# Patient Record
Sex: MALE | Race: WHITE | NOT HISPANIC OR LATINO | Employment: UNEMPLOYED | URBAN - NONMETROPOLITAN AREA
[De-identification: names, ages, dates, MRNs, and addresses within clinical notes are randomized per-mention and may not be internally consistent; named-entity substitution may affect disease eponyms.]

---

## 2021-05-19 ENCOUNTER — HOSPITAL ENCOUNTER (EMERGENCY)
Facility: HOSPITAL | Age: 31
Discharge: HOME | End: 2021-05-19
Attending: EMERGENCY MEDICINE

## 2021-05-19 VITALS
TEMPERATURE: 97.8 F | SYSTOLIC BLOOD PRESSURE: 143 MMHG | BODY MASS INDEX: 24.25 KG/M2 | RESPIRATION RATE: 19 BRPM | WEIGHT: 160 LBS | HEIGHT: 68 IN | OXYGEN SATURATION: 100 % | DIASTOLIC BLOOD PRESSURE: 86 MMHG | HEART RATE: 86 BPM

## 2021-05-19 DIAGNOSIS — Z48.02 VISIT FOR SUTURE REMOVAL: ICD-10-CM

## 2021-05-19 DIAGNOSIS — Z51.89 ENCOUNTER FOR WOUND RE-CHECK: Primary | ICD-10-CM

## 2021-05-19 PROCEDURE — 99281 EMR DPT VST MAYX REQ PHY/QHP: CPT | Performed by: EMERGENCY MEDICINE

## 2021-05-19 PROCEDURE — 99283 EMERGENCY DEPT VISIT LOW MDM: CPT

## 2021-05-19 RX ORDER — CEPHALEXIN 500 MG/1
500 CAPSULE ORAL EVERY 12 HOURS SCHEDULED
Qty: 10 CAPSULE | Refills: 0 | Status: SHIPPED | OUTPATIENT
Start: 2021-05-19 | End: 2021-05-24

## 2021-05-19 RX ORDER — BACITRACIN, NEOMYCIN, POLYMYXIN B 400; 3.5; 5 [USP'U]/G; MG/G; [USP'U]/G
1 OINTMENT TOPICAL ONCE
Status: COMPLETED | OUTPATIENT
Start: 2021-05-19 | End: 2021-05-19

## 2021-05-19 RX ADMIN — BACITRACIN, NEOMYCIN, POLYMYXIN B 1 SMALL APPLICATION: 400; 3.5; 5 OINTMENT TOPICAL at 11:38
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
## 2022-04-30 ENCOUNTER — HOSPITAL ENCOUNTER (EMERGENCY)
Facility: HOSPITAL | Age: 32
Discharge: HOME | End: 2022-04-30
Attending: EMERGENCY MEDICINE | Admitting: EMERGENCY MEDICINE
Payer: COMMERCIAL

## 2022-04-30 VITALS
TEMPERATURE: 97.7 F | OXYGEN SATURATION: 100 % | SYSTOLIC BLOOD PRESSURE: 141 MMHG | DIASTOLIC BLOOD PRESSURE: 74 MMHG | WEIGHT: 140 LBS | HEART RATE: 75 BPM | RESPIRATION RATE: 18 BRPM | BODY MASS INDEX: 23.32 KG/M2 | HEIGHT: 65 IN

## 2022-04-30 DIAGNOSIS — T15.00XA CORNEAL FOREIGN BODY: Primary | ICD-10-CM

## 2022-04-30 PROCEDURE — 90715 TDAP VACCINE 7 YRS/> IM: CPT | Performed by: EMERGENCY MEDICINE

## 2022-04-30 PROCEDURE — 99283 EMERGENCY DEPT VISIT LOW MDM: CPT | Performed by: EMERGENCY MEDICINE

## 2022-04-30 PROCEDURE — 90471 IMMUNIZATION ADMIN: CPT

## 2022-04-30 PROCEDURE — 99283 EMERGENCY DEPT VISIT LOW MDM: CPT

## 2022-04-30 RX ORDER — TETRACAINE HYDROCHLORIDE 5 MG/ML
1 SOLUTION OPHTHALMIC ONCE
Status: COMPLETED | OUTPATIENT
Start: 2022-04-30 | End: 2022-04-30

## 2022-04-30 RX ORDER — ERYTHROMYCIN 5 MG/G
0.5 OINTMENT OPHTHALMIC ONCE
Status: COMPLETED | OUTPATIENT
Start: 2022-04-30 | End: 2022-04-30

## 2022-04-30 RX ORDER — ERYTHROMYCIN 5 MG/G
OINTMENT OPHTHALMIC
Qty: 3.5 G | Refills: 0 | Status: SHIPPED | OUTPATIENT
Start: 2022-04-30

## 2022-04-30 RX ADMIN — TETRACAINE HYDROCHLORIDE 1 DROP: 5 SOLUTION OPHTHALMIC at 13:04

## 2022-04-30 RX ADMIN — ERYTHROMYCIN 0.5 INCH: 5 OINTMENT OPHTHALMIC at 13:02

## 2022-04-30 RX ADMIN — FLUORESCEIN SODIUM 1 STRIP: 1 STRIP OPHTHALMIC at 13:04

## 2022-04-30 RX ADMIN — TETANUS TOXOID, REDUCED DIPHTHERIA TOXOID AND ACELLULAR PERTUSSIS VACCINE, ADSORBED 0.5 ML: 5; 2.5; 8; 8; 2.5 SUSPENSION INTRAMUSCULAR at 12:59

## 2022-04-30 NOTE — DISCHARGE INSTRUCTIONS
Return immediately if worse or any new symptoms  Please call ophthalmologist as soon as possible to arrange appointment as soon as possible:    [facility] Ophthalmology scheduling call [address] [facility] [place] call [phone]    [facility] [address], [facility] [place] call [facility] [address] [place] [address] [place] call [phone]    [facility] [address] [place] [address], [address], [address] call [address] [facility] [address] [facility], [place], [place] call [facility] [place], [place] [address] [place] [address], [address] [place] [address] call [phone]

## 2022-04-30 NOTE — ED PROVIDER NOTES
History  Chief Complaint   Patient presents with    Eye Problem     pt arrives reporting he was using a  yesterday when a piece of metal flew up and hit left eye area  53-year-old male grinding muffler yesterday with devlin in face incurred left eye foreign body, believes he rinsed out, unsure of recent tetanus      Eye Problem  Location:  Left eye  Quality:  Dull  Severity:  Mild  Onset quality:  Sudden  Timing:  Constant  Progression:  Unchanged  Chronicity:  New  Context: direct trauma    Relieved by:  Nothing  Worsened by:  Nothing  Ineffective treatments:  None tried  Associated symptoms: blurred vision    Risk factors: no conjunctival hemorrhage and no previous injury to eye        None       History reviewed  No pertinent past medical history  History reviewed  No pertinent surgical history  History reviewed  No pertinent family history  I have reviewed and agree with the history as documented  E-Cigarette/Vaping     E-Cigarette/Vaping Substances     Social History     Tobacco Use    Smoking status: Current Every Day Smoker     Packs/day: 1 00    Smokeless tobacco: Never Used   Substance Use Topics    Alcohol use: Not Currently    Drug use: Not Currently       Review of Systems   Eyes: Positive for blurred vision  All other systems reviewed and are negative  Physical Exam  Physical Exam  Vitals and nursing note reviewed  Constitutional:       General: He is not in acute distress  Eyes:      Extraocular Movements: Extraocular movements intact  Pupils: Pupils are equal, round, and reactive to light        Comments: Left eye vision 20/30    Left eye:  Conjunctival injection, tiny inflammatory area at superomedial quadrant of cornea with central foreign body, eccentric located away from pupil   Skin:     Comments: Superficial burn abrasions at left medial canthus irregular less than 1 cm diameter as well as right cancel/mouth area and right mental facial area Neurological:      Mental Status: He is alert           Vital Signs  ED Triage Vitals [04/30/22 1219]   Temperature Pulse Respirations Blood Pressure SpO2   97 7 °F (36 5 °C) 75 18 141/74 100 %      Temp src Heart Rate Source Patient Position - Orthostatic VS BP Location FiO2 (%)   -- -- -- -- --      Pain Score       --           Vitals:    04/30/22 1219   BP: 141/74   Pulse: 75         Visual Acuity      ED Medications  Medications   tetracaine 0 5 % ophthalmic solution 1 drop (has no administration in time range)   erythromycin (ILOTYCIN) 0 5 % ophthalmic ointment 0 5 inch (has no administration in time range)   fluorescein sodium sterile ophthalmic strip 1 strip (has no administration in time range)   tetanus-diphtheria-acellular pertussis (BOOSTRIX) IM injection 0 5 mL (0 5 mL Intramuscular Given 4/30/22 1259)       Diagnostic Studies  Results Reviewed     None                 No orders to display              Procedures  Procedures         ED Course  ED Course as of 04/30/22 1301   Sat Apr 30, 2022   1300 Procedure note:Tetracaine instilled 2 drops, fluorescein placed, no fluorescein uptake    Unable to free foreign body with a Q-tip or blunt needle, but inflammatory material removed, using tiny bur foreign body removed, no staining, well tolerated, voice good understanding of close outpatient follow-up and will return immediately if worse or any new symptoms or if incompletely resolved within 24 hours and follow up with Ophthalmology                                             MDM    Disposition  Final diagnoses:   Corneal foreign body - Removed, inflamed     Time reflects when diagnosis was documented in both MDM as applicable and the Disposition within this note     Time User Action Codes Description Comment    4/30/2022 12:41 PM Gil Aburto Add [T15 00XA] Corneal foreign body     4/30/2022 12:41 PM Gil Aburto Modify [T15 00XA] Corneal foreign body Removed, inflamed      ED Disposition     ED Disposition Condition Date/Time Comment    Discharge Stable Sat Apr 30, 2022 12:40 PM Lee Ford discharge to home/self care  Follow-up Information     Follow up With Specialties Details Why [address] [place], [place] Optometry   [facility]  [address],[facility] Floor  [phone]            Patient's Medications   Discharge Prescriptions    ERYTHROMYCIN (ILOTYCIN) OPHTHALMIC OINTMENT    Place a 1/2 inch ribbon of ointment into the lower eyelid every 6 hours and small amount as needed for comfort       Start Date: 4/30/2022 End Date: --       Order Dose: --       Quantity: 3 5 g    Refills: 0       No discharge procedures on file      PDMP Review     None          ED Provider  Electronically Signed by           Juanpablo Fernandez DO  04/30/22 5477

## 2023-09-01 ENCOUNTER — HOSPITAL ENCOUNTER (EMERGENCY)
Facility: HOSPITAL | Age: 33
Discharge: HOME | End: 2023-09-01
Attending: EMERGENCY MEDICINE | Admitting: EMERGENCY MEDICINE

## 2023-09-01 VITALS
DIASTOLIC BLOOD PRESSURE: 83 MMHG | RESPIRATION RATE: 17 BRPM | HEIGHT: 65 IN | HEART RATE: 75 BPM | BODY MASS INDEX: 23.32 KG/M2 | WEIGHT: 140 LBS | SYSTOLIC BLOOD PRESSURE: 137 MMHG | TEMPERATURE: 98 F | OXYGEN SATURATION: 100 %

## 2023-09-01 DIAGNOSIS — S05.01XA ABRASION OF RIGHT CORNEA, INITIAL ENCOUNTER: Primary | ICD-10-CM

## 2023-09-01 PROCEDURE — 99282 EMERGENCY DEPT VISIT SF MDM: CPT

## 2023-09-01 RX ORDER — CIPROFLOXACIN HYDROCHLORIDE 3.5 MG/ML
2 SOLUTION/ DROPS TOPICAL ONCE
Status: COMPLETED | OUTPATIENT
Start: 2023-09-01 | End: 2023-09-01

## 2023-09-01 RX ADMIN — CIPROFLOXACIN HYDROCHLORIDE 2 DROP: 3 SOLUTION/ DROPS OPHTHALMIC at 22:49

## 2023-09-01 RX ADMIN — FLUORESCEIN SODIUM 1 STRIP: 1 STRIP OPHTHALMIC at 22:47
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
## 2024-06-04 ENCOUNTER — APPOINTMENT (EMERGENCY)
Dept: CT IMAGING | Facility: HOSPITAL | Age: 34
End: 2024-06-04

## 2024-06-04 ENCOUNTER — APPOINTMENT (EMERGENCY)
Dept: RADIOLOGY | Facility: HOSPITAL | Age: 34
End: 2024-06-04

## 2024-06-04 ENCOUNTER — HOSPITAL ENCOUNTER (EMERGENCY)
Facility: HOSPITAL | Age: 34
Discharge: HOME | End: 2024-06-05
Attending: EMERGENCY MEDICINE

## 2024-06-04 DIAGNOSIS — S27.321A CONTUSION OF RIGHT LUNG, INITIAL ENCOUNTER: ICD-10-CM
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
## 2024-06-04 DIAGNOSIS — S22.41XA CLOSED FRACTURE OF MULTIPLE RIBS OF RIGHT SIDE, INITIAL ENCOUNTER: ICD-10-CM

## 2024-06-04 LAB
BASOPHILS # BLD AUTO: 0.04 THOUSANDS/ÂΜL (ref 0–0.1)
BASOPHILS NFR BLD AUTO: 0 % (ref 0–1)
EOSINOPHIL # BLD AUTO: 0.01 THOUSAND/ÂΜL (ref 0–0.61)
EOSINOPHIL NFR BLD AUTO: 0 % (ref 0–6)
ERYTHROCYTE [DISTWIDTH] IN BLOOD BY AUTOMATED COUNT: 13.4 % (ref 11.6–15.1)
HCT VFR BLD AUTO: 45.2 % (ref 36.5–49.3)
HGB BLD-MCNC: 15.2 G/DL (ref 12–17)
IMM GRANULOCYTES # BLD AUTO: 0.11 THOUSAND/UL (ref 0–0.2)
IMM GRANULOCYTES NFR BLD AUTO: 1 % (ref 0–2)
LYMPHOCYTES # BLD AUTO: 1.95 THOUSANDS/ÂΜL (ref 0.6–4.47)
LYMPHOCYTES NFR BLD AUTO: 12 % (ref 14–44)
MCH RBC QN AUTO: 30 PG (ref 26.8–34.3)
MCHC RBC AUTO-ENTMCNC: 33.6 G/DL (ref 31.4–37.4)
MCV RBC AUTO: 89 FL (ref 82–98)
MONOCYTES # BLD AUTO: 0.87 THOUSAND/ÂΜL (ref 0.17–1.22)
MONOCYTES NFR BLD AUTO: 5 % (ref 4–12)
NEUTROPHILS # BLD AUTO: 13.07 THOUSANDS/ÂΜL (ref 1.85–7.62)
NEUTS SEG NFR BLD AUTO: 82 % (ref 43–75)
NRBC BLD AUTO-RTO: 0 /100 WBCS
PLATELET # BLD AUTO: 249 THOUSANDS/UL (ref 149–390)
PMV BLD AUTO: 9.8 FL (ref 8.9–12.7)
RBC # BLD AUTO: 5.06 MILLION/UL (ref 3.88–5.62)
WBC # BLD AUTO: 16.05 THOUSAND/UL (ref 4.31–10.16)

## 2024-06-04 PROCEDURE — 80053 COMPREHEN METABOLIC PANEL: CPT | Performed by: EMERGENCY MEDICINE

## 2024-06-04 PROCEDURE — 73000 X-RAY EXAM OF COLLAR BONE: CPT

## 2024-06-04 PROCEDURE — 99285 EMERGENCY DEPT VISIT HI MDM: CPT | Performed by: EMERGENCY MEDICINE

## 2024-06-04 PROCEDURE — 72125 CT NECK SPINE W/O DYE: CPT

## 2024-06-04 PROCEDURE — 93005 ELECTROCARDIOGRAM TRACING: CPT

## 2024-06-04 PROCEDURE — 36415 COLL VENOUS BLD VENIPUNCTURE: CPT | Performed by: EMERGENCY MEDICINE

## 2024-06-04 PROCEDURE — 73030 X-RAY EXAM OF SHOULDER: CPT

## 2024-06-04 PROCEDURE — 99285 EMERGENCY DEPT VISIT HI MDM: CPT

## 2024-06-04 PROCEDURE — 70450 CT HEAD/BRAIN W/O DYE: CPT

## 2024-06-04 PROCEDURE — 71250 CT THORAX DX C-: CPT

## 2024-06-04 PROCEDURE — 71045 X-RAY EXAM CHEST 1 VIEW: CPT

## 2024-06-04 PROCEDURE — 85025 COMPLETE CBC W/AUTO DIFF WBC: CPT | Performed by: EMERGENCY MEDICINE

## 2024-06-04 RX ORDER — OXYCODONE HYDROCHLORIDE AND ACETAMINOPHEN 5; 325 MG/1; MG/1
1 TABLET ORAL ONCE
Status: COMPLETED | OUTPATIENT
Start: 2024-06-04 | End: 2024-06-04

## 2024-06-04 RX ADMIN — OXYCODONE HYDROCHLORIDE AND ACETAMINOPHEN 1 TABLET: 5; 325 TABLET ORAL at 22:30

## 2024-06-05 VITALS
RESPIRATION RATE: 16 BRPM | TEMPERATURE: 98.7 F | BODY MASS INDEX: 23.66 KG/M2 | HEART RATE: 105 BPM | SYSTOLIC BLOOD PRESSURE: 141 MMHG | DIASTOLIC BLOOD PRESSURE: 73 MMHG | WEIGHT: 142 LBS | OXYGEN SATURATION: 99 % | HEIGHT: 65 IN

## 2024-06-05 PROBLEM — S22.41XA MULTIPLE CLOSED FRACTURES OF RIBS OF RIGHT SIDE: Status: ACTIVE | Noted: 2024-06-05

## 2024-06-05 PROBLEM — S27.321A RIGHT PULMONARY CONTUSION: Status: ACTIVE | Noted: 2024-06-05

## 2024-06-05 LAB
ALBUMIN SERPL BCP-MCNC: 4.8 G/DL (ref 3.5–5)
ALP SERPL-CCNC: 48 U/L (ref 34–104)
ALT SERPL W P-5'-P-CCNC: 27 U/L (ref 7–52)
ANION GAP SERPL CALCULATED.3IONS-SCNC: 11 MMOL/L (ref 4–13)
AST SERPL W P-5'-P-CCNC: 41 U/L (ref 13–39)
ATRIAL RATE: 113 BPM
BILIRUB SERPL-MCNC: 0.32 MG/DL (ref 0.2–1)
BUN SERPL-MCNC: 11 MG/DL (ref 5–25)
CALCIUM SERPL-MCNC: 9.3 MG/DL (ref 8.4–10.2)
CHLORIDE SERPL-SCNC: 103 MMOL/L (ref 96–108)
CO2 SERPL-SCNC: 23 MMOL/L (ref 21–32)
CREAT SERPL-MCNC: 0.9 MG/DL (ref 0.6–1.3)
GFR SERPL CREATININE-BSD FRML MDRD: 112 ML/MIN/1.73SQ M
GLUCOSE SERPL-MCNC: 102 MG/DL (ref 65–140)
P AXIS: 65 DEGREES
POTASSIUM SERPL-SCNC: 4.1 MMOL/L (ref 3.5–5.3)
PR INTERVAL: 154 MS
PROT SERPL-MCNC: 7.4 G/DL (ref 6.4–8.4)
QRS AXIS: 69 DEGREES
QRSD INTERVAL: 86 MS
QT INTERVAL: 316 MS
QTC INTERVAL: 433 MS
SODIUM SERPL-SCNC: 137 MMOL/L (ref 135–147)
T WAVE AXIS: 49 DEGREES
VENTRICULAR RATE: 113 BPM

## 2024-06-05 PROCEDURE — 93010 ELECTROCARDIOGRAM REPORT: CPT | Performed by: INTERNAL MEDICINE

## 2024-06-05 RX ORDER — OXYCODONE HYDROCHLORIDE AND ACETAMINOPHEN 5; 325 MG/1; MG/1
1 TABLET ORAL EVERY 4 HOURS PRN
Qty: 10 TABLET | Refills: 0 | Status: SHIPPED | OUTPATIENT
Start: 2024-06-05

## 2024-06-05 NOTE — ED NOTES
Incentive spirometer given and pt education provided with return demonstration from pt. Right arm sling applied to pt per order.     Gregoria Cabrales, RN  06/05/24 0101
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
## 2024-06-25 ENCOUNTER — OFFICE VISIT (OUTPATIENT)
Dept: SURGERY | Facility: CLINIC | Age: 34
End: 2024-06-25

## 2024-06-25 VITALS
BODY MASS INDEX: 24.49 KG/M2 | HEIGHT: 65 IN | SYSTOLIC BLOOD PRESSURE: 126 MMHG | HEART RATE: 84 BPM | TEMPERATURE: 97.6 F | WEIGHT: 147 LBS | DIASTOLIC BLOOD PRESSURE: 80 MMHG | RESPIRATION RATE: 14 BRPM | OXYGEN SATURATION: 97 %

## 2024-06-25 DIAGNOSIS — S27.321A CONTUSION OF RIGHT LUNG, INITIAL ENCOUNTER: ICD-10-CM

## 2024-06-25 DIAGNOSIS — S22.41XA CLOSED FRACTURE OF MULTIPLE RIBS OF RIGHT SIDE, INITIAL ENCOUNTER: ICD-10-CM

## 2024-06-25 DIAGNOSIS — S42.109A SCAPULA FRACTURE: Primary | ICD-10-CM

## 2024-06-25 PROCEDURE — 99204 OFFICE O/P NEW MOD 45 MIN: CPT | Performed by: NURSE PRACTITIONER
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
## 2024-07-02 ENCOUNTER — HOSPITAL ENCOUNTER (OUTPATIENT)
Dept: RADIOLOGY | Facility: CLINIC | Age: 34
Discharge: HOME | End: 2024-07-02

## 2024-07-02 ENCOUNTER — OFFICE VISIT (OUTPATIENT)
Dept: OBGYN CLINIC | Facility: CLINIC | Age: 34
End: 2024-07-02

## 2024-07-02 VITALS
WEIGHT: 140 LBS | TEMPERATURE: 98.5 F | HEART RATE: 93 BPM | BODY MASS INDEX: 23.32 KG/M2 | HEIGHT: 65 IN | OXYGEN SATURATION: 98 % | SYSTOLIC BLOOD PRESSURE: 130 MMHG | DIASTOLIC BLOOD PRESSURE: 80 MMHG

## 2024-07-02 DIAGNOSIS — M25.611 SHOULDER STIFFNESS, RIGHT: ICD-10-CM

## 2024-07-02 DIAGNOSIS — S42.114A CLOSED NONDISPLACED FRACTURE OF BODY OF RIGHT SCAPULA, INITIAL ENCOUNTER: ICD-10-CM

## 2024-07-02 DIAGNOSIS — M25.511 ACUTE PAIN OF RIGHT SHOULDER: ICD-10-CM

## 2024-07-02 DIAGNOSIS — S22.41XD: ICD-10-CM

## 2024-07-02 DIAGNOSIS — S42.114D CLOSED NONDISPLACED FRACTURE OF BODY OF RIGHT SCAPULA WITH ROUTINE HEALING, SUBSEQUENT ENCOUNTER: Primary | ICD-10-CM

## 2024-07-02 DIAGNOSIS — S42.301D: ICD-10-CM

## 2024-07-02 PROCEDURE — 73010 X-RAY EXAM OF SHOULDER BLADE: CPT

## 2024-07-02 PROCEDURE — 99203 OFFICE O/P NEW LOW 30 MIN: CPT | Performed by: STUDENT IN AN ORGANIZED HEALTH CARE EDUCATION/TRAINING PROGRAM
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
## 2024-11-11 ENCOUNTER — APPOINTMENT (EMERGENCY)
Dept: RADIOLOGY | Facility: HOSPITAL | Age: 34
End: 2024-11-11

## 2024-11-11 ENCOUNTER — HOSPITAL ENCOUNTER (EMERGENCY)
Facility: HOSPITAL | Age: 34
Discharge: HOME | End: 2024-11-11
Attending: EMERGENCY MEDICINE | Admitting: EMERGENCY MEDICINE

## 2024-11-11 VITALS
RESPIRATION RATE: 16 BRPM | SYSTOLIC BLOOD PRESSURE: 126 MMHG | TEMPERATURE: 97.5 F | DIASTOLIC BLOOD PRESSURE: 86 MMHG | HEART RATE: 77 BPM | OXYGEN SATURATION: 100 %

## 2024-11-11 DIAGNOSIS — J18.9 RLL PNEUMONIA: Primary | ICD-10-CM

## 2024-11-11 LAB
FLUAV AG UPPER RESP QL IA.RAPID: NEGATIVE
FLUBV AG UPPER RESP QL IA.RAPID: NEGATIVE
SARS-COV+SARS-COV-2 AG RESP QL IA.RAPID: NEGATIVE

## 2024-11-11 PROCEDURE — 99284 EMERGENCY DEPT VISIT MOD MDM: CPT | Performed by: EMERGENCY MEDICINE

## 2024-11-11 PROCEDURE — 99283 EMERGENCY DEPT VISIT LOW MDM: CPT

## 2024-11-11 PROCEDURE — 87804 INFLUENZA ASSAY W/OPTIC: CPT | Performed by: EMERGENCY MEDICINE

## 2024-11-11 PROCEDURE — 71046 X-RAY EXAM CHEST 2 VIEWS: CPT

## 2024-11-11 PROCEDURE — 87811 SARS-COV-2 COVID19 W/OPTIC: CPT | Performed by: EMERGENCY MEDICINE

## 2024-11-11 RX ORDER — LEVOFLOXACIN 750 MG/1
750 TABLET, FILM COATED ORAL ONCE
Status: COMPLETED | OUTPATIENT
Start: 2024-11-11 | End: 2024-11-11

## 2024-11-11 RX ORDER — LEVOFLOXACIN 750 MG/1
750 TABLET, FILM COATED ORAL EVERY 24 HOURS
Qty: 7 TABLET | Refills: 0 | Status: SHIPPED | OUTPATIENT
Start: 2024-11-11 | End: 2024-11-18

## 2024-11-11 RX ADMIN — LEVOFLOXACIN 750 MG: 750 TABLET, FILM COATED ORAL at 13:54

## 2024-11-11 NOTE — Clinical Note
Victor M Allan was seen and treated in our emergency department on 11/11/2024.                Diagnosis:     Victor M  may return to work on return date.    He may return on this date: 11/14/2024         If you have any questions or concerns, please don't hesitate to call.      Salvador Carpenter MD    ______________________________           _______________          _______________  Hospital Representative                              Date                                Time

## 2024-11-11 NOTE — ED PROVIDER NOTES
Time reflects when diagnosis was documented in both MDM as applicable and the Disposition within this note       Time User Action Codes Description Comment    11/11/2024  1:32 PM Salvador Carpenter Add [J18.9] RLL pneumonia           ED Disposition       ED Disposition   Discharge    Condition   Stable    Date/Time   Mon Nov 11, 2024  1:32 PM    Comment   Victor M Allan discharge to home/self care.                   Assessment & Plan       Medical Decision Making  1300: Patient appears well, vital signs reviewed.  Patient appears to be suffering from viral URI.  No respiratory distress.  Check COVID/flu PCR and chest x-ray.    1400: Chest x-ray shows right lower lobe pneumonia.  Patient has remained stable throughout ED course.  Plan to start on antibiotics.  Close follow-up with PCP till resolution.    Amount and/or Complexity of Data Reviewed  Labs: ordered.  Radiology: ordered and independent interpretation performed.     Details: Chest x-ray--right lower lobe pneumonia    Risk  Prescription drug management.             Medications   levofloxacin (LEVAQUIN) tablet 750 mg (750 mg Oral Given 11/11/24 1354)       ED Risk Strat Scores                           SBIRT 22yo+      Flowsheet Row Most Recent Value   Initial Alcohol Screen: US AUDIT-C     1. How often do you have a drink containing alcohol? 0 Filed at: 11/11/2024 1256   2. How many drinks containing alcohol do you have on a typical day you are drinking?  0 Filed at: 11/11/2024 1256   3a. Male UNDER 65: How often do you have five or more drinks on one occasion? 0 Filed at: 11/11/2024 1256   Audit-C Score 0 Filed at: 11/11/2024 1256   DAVIAN: How many times in the past year have you...    Used an illegal drug or used a prescription medication for non-medical reasons? Never Filed at: 11/11/2024 1256                            History of Present Illness       Chief Complaint   Patient presents with    Cold Like Symptoms     Pt reports chest congestion and a cough  x1 week.        History reviewed. No pertinent past medical history.   History reviewed. No pertinent surgical history.   History reviewed. No pertinent family history.   Social History     Tobacco Use    Smoking status: Every Day     Current packs/day: 1.00     Types: Cigarettes    Smokeless tobacco: Never   Vaping Use    Vaping status: Every Day   Substance Use Topics    Alcohol use: Not Currently    Drug use: Not Currently      E-Cigarette/Vaping    E-Cigarette Use Current Every Day User       E-Cigarette/Vaping Substances      I have reviewed and agree with the history as documented.       History provided by:  Medical records and patient  Cough  Cough characteristics:  Non-productive  Sputum characteristics:  Nondescript  Severity:  Mild  Onset quality:  Gradual  Duration:  1 week  Timing:  Intermittent  Progression:  Waxing and waning  Chronicity:  New  Smoker: yes    Context comment:  1 week of nonproductive cough, sore throat  Relieved by:  Nothing  Worsened by:  Nothing  Ineffective treatments:  None tried  Associated symptoms: sore throat    Associated symptoms: no chest pain, no chills, no ear pain, no eye discharge, no fever, no headaches, no rash, no rhinorrhea and no shortness of breath        Review of Systems   Constitutional:  Negative for appetite change, chills, fatigue and fever.   HENT:  Positive for sore throat. Negative for ear pain, rhinorrhea and trouble swallowing.    Eyes:  Negative for pain, discharge and visual disturbance.   Respiratory:  Positive for cough. Negative for chest tightness and shortness of breath.    Cardiovascular:  Negative for chest pain and palpitations.   Gastrointestinal:  Negative for abdominal pain, nausea and vomiting.   Endocrine: Negative for polydipsia, polyphagia and polyuria.   Genitourinary:  Negative for difficulty urinating, dysuria, hematuria and testicular pain.   Musculoskeletal:  Negative for arthralgias and back pain.   Skin:  Negative for color  change and rash.   Allergic/Immunologic: Negative for immunocompromised state.   Neurological:  Negative for dizziness, seizures, syncope, weakness and headaches.   Hematological:  Negative for adenopathy.   Psychiatric/Behavioral:  Negative for confusion and dysphoric mood.    All other systems reviewed and are negative.          Objective       ED Triage Vitals [11/11/24 1255]   Temperature Pulse Blood Pressure Respirations SpO2 Patient Position - Orthostatic VS   97.5 °F (36.4 °C) 77 126/86 16 100 % Sitting      Temp Source Heart Rate Source BP Location FiO2 (%) Pain Score    Temporal Monitor Left arm -- --      Vitals      Date and Time Temp Pulse SpO2 Resp BP Pain Score FACES Pain Rating User   11/11/24 1255 97.5 °F (36.4 °C) 77 100 % 16 126/86 -- -- MB            Physical Exam  Vitals and nursing note reviewed.   Constitutional:       General: He is not in acute distress.     Appearance: Normal appearance. He is not ill-appearing, toxic-appearing or diaphoretic.   HENT:      Head: Normocephalic and atraumatic.      Nose: Nose normal. No congestion or rhinorrhea.      Mouth/Throat:      Mouth: Mucous membranes are moist.      Pharynx: Oropharynx is clear. No oropharyngeal exudate or posterior oropharyngeal erythema.   Eyes:      General:         Right eye: No discharge.         Left eye: No discharge.   Cardiovascular:      Rate and Rhythm: Normal rate and regular rhythm.      Pulses: Normal pulses.      Heart sounds: Normal heart sounds. No murmur heard.     No gallop.   Pulmonary:      Effort: Pulmonary effort is normal. No respiratory distress.      Breath sounds: Normal breath sounds. No stridor. No wheezing, rhonchi or rales.   Chest:      Chest wall: No tenderness.   Abdominal:      General: Bowel sounds are normal. There is no distension.      Palpations: Abdomen is soft. There is no mass.      Tenderness: There is no abdominal tenderness. There is no right CVA tenderness, left CVA tenderness, guarding  or rebound.      Hernia: No hernia is present.   Musculoskeletal:         General: Normal range of motion.      Cervical back: Normal range of motion and neck supple.   Skin:     General: Skin is warm and dry.      Capillary Refill: Capillary refill takes less than 2 seconds.   Neurological:      General: No focal deficit present.      Mental Status: He is alert and oriented to person, place, and time.      Cranial Nerves: No cranial nerve deficit.      Sensory: No sensory deficit.      Motor: No weakness.      Coordination: Coordination normal.      Gait: Gait normal.      Deep Tendon Reflexes: Reflexes normal.   Psychiatric:         Mood and Affect: Mood normal.         Behavior: Behavior normal.         Thought Content: Thought content normal.         Judgment: Judgment normal.         Results Reviewed       Procedure Component Value Units Date/Time    FLU/COVID Rapid Antigen (30 min. TAT) - Preferred screening test in ED [[id number]]  (Normal) Collected: 11/11/24 1322    Lab Status: Final result Specimen: Nares from Nose Updated: 11/11/24 1347     SARS COV Rapid Antigen Negative     Influenza A Rapid Antigen Negative     Influenza B Rapid Antigen Negative    Narrative:      This test has been performed using the [employer]Lineidel Marie 2 FLU+SARS Antigen test under the Emergency Use Authorization (EUA). This test has been validated by the  and verified by the performing laboratory. The Marie uses lateral flow immunofluorescent sandwich assay to detect SARS-COV, Influenza A and Influenza B Antigen.     The Quidel Marie 2 SARS Antigen test does not differentiate between SARS-CoV and SARS-CoV-2.     Negative results are presumptive and may be confirmed with a molecular assay, if necessary, for patient management. Negative results do not rule out SARS-CoV-2 or influenza infection and should not be used as the sole basis for treatment or patient management decisions. A negative test result may occur if the level of  antigen in a sample is below the limit of detection of this test.     Positive results are indicative of the presence of viral antigens, but do not rule out bacterial infection or co-infection with other viruses.     All test results should be used as an adjunct to clinical observations and other information available to the provider.    FOR PEDIATRIC PATIENTS - copy/paste COVID Guidelines URL to browser: [email or web address]            XR chest 2 views   Final Interpretation by Burke Snow MD (11/11 1351)      Right lower lobe pneumonia.            Workstation performed: [id number]             Procedures    ED Medication and Procedure Management   Prior to Admission Medications   Prescriptions Last Dose Informant Patient Reported? Taking?   erythromycin (ILOTYCIN) ophthalmic ointment   No No   Sig: Place a 1/2 inch ribbon of ointment into the lower eyelid every 6 hours and small amount as needed for comfort   Patient not taking: Reported on 6/25/2024   oxyCODONE-acetaminophen (PERCOCET) 5-325 mg per tablet   No No   Sig: Take 1 tablet by mouth every 4 (four) hours as needed for moderate pain or severe pain for up to 10 doses Max Daily Amount: 6 tablets      Facility-Administered Medications: None     Discharge Medication List as of 11/11/2024  1:33 PM        START taking these medications    Details   levofloxacin (LEVAQUIN) 750 mg tablet Take 1 tablet (750 mg total) by mouth every 24 hours for 7 days, Starting Mon 11/11/2024, Until Mon 11/18/2024, Normal           CONTINUE these medications which have NOT CHANGED    Details   erythromycin (ILOTYCIN) ophthalmic ointment Place a 1/2 inch ribbon of ointment into the lower eyelid every 6 hours and small amount as needed for comfort, Normal      oxyCODONE-acetaminophen (PERCOCET) 5-325 mg per tablet Take 1 tablet by mouth every 4 (four) hours as needed for moderate pain or severe pain for up to 10 doses Max  Daily Amount: 6 tablets, Starting Wed 6/5/2024, Normal           No discharge procedures on file.  ED SEPSIS DOCUMENTATION   Time reflects when diagnosis was documented in both MDM as applicable and the Disposition within this note       Time User Action Codes Description Comment    11/11/2024  1:32 PM Salvador Carpenter Add [J18.9] RLL pneumonia                  Salvador Carpenter MD  11/11/24 2028

## 2025-06-04 ENCOUNTER — APPOINTMENT (EMERGENCY)
Dept: RADIOLOGY | Facility: HOSPITAL | Age: 35
End: 2025-06-04

## 2025-06-04 ENCOUNTER — HOSPITAL ENCOUNTER (EMERGENCY)
Facility: HOSPITAL | Age: 35
Discharge: HOME | End: 2025-06-04
Attending: EMERGENCY MEDICINE

## 2025-06-04 VITALS
WEIGHT: 145 LBS | RESPIRATION RATE: 20 BRPM | SYSTOLIC BLOOD PRESSURE: 131 MMHG | OXYGEN SATURATION: 100 % | TEMPERATURE: 98.6 F | HEART RATE: 85 BPM | BODY MASS INDEX: 24.13 KG/M2 | DIASTOLIC BLOOD PRESSURE: 80 MMHG

## 2025-06-04 DIAGNOSIS — J40 BRONCHITIS: Primary | ICD-10-CM

## 2025-06-04 DIAGNOSIS — J06.9 URI (UPPER RESPIRATORY INFECTION): ICD-10-CM

## 2025-06-04 PROCEDURE — 87804 INFLUENZA ASSAY W/OPTIC: CPT | Performed by: EMERGENCY MEDICINE

## 2025-06-04 PROCEDURE — 99284 EMERGENCY DEPT VISIT MOD MDM: CPT | Performed by: EMERGENCY MEDICINE

## 2025-06-04 PROCEDURE — 71045 X-RAY EXAM CHEST 1 VIEW: CPT

## 2025-06-04 PROCEDURE — 87811 SARS-COV-2 COVID19 W/OPTIC: CPT | Performed by: EMERGENCY MEDICINE

## 2025-06-04 PROCEDURE — 99284 EMERGENCY DEPT VISIT MOD MDM: CPT

## 2025-06-04 RX ORDER — PREDNISONE 20 MG/1
60 TABLET ORAL DAILY
Qty: 15 TABLET | Refills: 0 | Status: SHIPPED | OUTPATIENT
Start: 2025-06-04 | End: 2025-06-09

## 2025-06-04 RX ORDER — PREDNISONE 20 MG/1
60 TABLET ORAL ONCE
Status: COMPLETED | OUTPATIENT
Start: 2025-06-04 | End: 2025-06-04

## 2025-06-04 RX ORDER — DOXYCYCLINE 100 MG/1
100 CAPSULE ORAL 2 TIMES DAILY
Qty: 14 CAPSULE | Refills: 0 | Status: SHIPPED | OUTPATIENT
Start: 2025-06-04 | End: 2025-06-11

## 2025-06-04 RX ORDER — DOXYCYCLINE 100 MG/1
100 CAPSULE ORAL ONCE
Status: COMPLETED | OUTPATIENT
Start: 2025-06-04 | End: 2025-06-04

## 2025-06-04 RX ADMIN — PREDNISONE 60 MG: 20 TABLET ORAL at 04:41

## 2025-06-04 RX ADMIN — DOXYCYCLINE HYCLATE 100 MG: 100 CAPSULE ORAL at 04:41

## 2025-06-04 NOTE — Clinical Note
Victor M Allan was seen and treated in our emergency department on 6/4/2025.            off work today and tomorrow    Diagnosis:     Victor M  .    He may return on this date:          If you have any questions or concerns, please don't hesitate to call.      Timur Lloyd, DO    ______________________________           _______________          _______________  Hospital Representative                              Date                                Time

## 2025-06-04 NOTE — ED PROVIDER NOTES
Time reflects when diagnosis was documented in both MDM as applicable and the Disposition within this note       Time User Action Codes Description Comment    6/4/2025  4:39 AM Timur Lloyd Add [J40] Bronchitis     6/4/2025  4:39 AM Timur Lloyd [J06.9] URI (upper respiratory infection)           ED Disposition       ED Disposition   Discharge    Condition   Stable    Date/Time   Wed Jun 4, 2025  4:39 AM    Comment   Victor M Allan discharge to home/self care.                   Assessment & Plan       Medical Decision Making  Differential diagnosis included but not limited to pneumonia bronchitis upper respiratory infection COVID flu.  Patient remained clinically and hemodynamically stable in the emergency department he is afebrile nontoxic well-appearing normal O2 saturation on room air no respiratory distress.  X-rays reveal no evidence of focal pneumonia clinically suspect a bronchitis and upper respiratory infection will treat with steroid course and antibiotics advised rest plenty fluids supportive care fever control and prompt follow-up with primary physician for further evaluation and treatment and obtain test results.  return precautions and anticipatory guidance discussed.      Problems Addressed:  Bronchitis: acute illness or injury  URI (upper respiratory infection): acute illness or injury    Amount and/or Complexity of Data Reviewed  Labs: ordered. Decision-making details documented in ED Course.  Radiology: ordered and independent interpretation performed. Decision-making details documented in ED Course.    Risk  Prescription drug management.             Medications   doxycycline hyclate (VIBRAMYCIN) capsule 100 mg (has no administration in time range)   predniSONE tablet 60 mg (has no administration in time range)       ED Risk Strat Scores                    No data recorded                            History of Present Illness       Chief Complaint   Patient presents with    Cough     Patient  thinks he has pneumonia. Cough for 1 week with shortness of breath.       Past Medical History[1]   Past Surgical History[2]   Family History[3]   Social History[4]   E-Cigarette/Vaping    E-Cigarette Use Current Every Day User       E-Cigarette/Vaping Substances      I have reviewed and agree with the history as documented.     Patient is a 35-year-old male presents emergency department due to cough for a week.        History provided by:  Patient  Cough  Associated symptoms: shortness of breath    Associated symptoms: no chest pain and no fever        Review of Systems   Constitutional:  Negative for fever.   HENT:  Positive for congestion.    Respiratory:  Positive for cough and shortness of breath.    Cardiovascular:  Negative for chest pain.   Gastrointestinal:  Negative for abdominal pain, nausea and vomiting.   All other systems reviewed and are negative.          Objective       ED Triage Vitals [06/04/25 0423]   Temperature Pulse Blood Pressure Respirations SpO2 Patient Position - Orthostatic VS   98.6 °F (37 °C) 85 131/80 20 100 % Sitting      Temp Source Heart Rate Source BP Location FiO2 (%) Pain Score    Temporal Monitor Left arm -- No Pain      Vitals      Date and Time Temp Pulse SpO2 Resp BP Pain Score FACES Pain Rating User   06/04/25 0423 98.6 °F (37 °C) 85 100 % 20 131/80 No Pain -- MS            Physical Exam  Vitals and nursing note reviewed.   Constitutional:       General: He is not in acute distress.     Appearance: Normal appearance.   HENT:      Head: Normocephalic and atraumatic.      Nose: Congestion present.     Eyes:      Conjunctiva/sclera: Conjunctivae normal.       Cardiovascular:      Rate and Rhythm: Normal rate and regular rhythm.   Pulmonary:      Effort: Pulmonary effort is normal. No respiratory distress.      Breath sounds: Normal breath sounds.     Skin:     General: Skin is dry.     Neurological:      General: No focal deficit present.      Mental Status: He is alert and  oriented to person, place, and time.         Results Reviewed       Procedure Component Value Units Date/Time    FLU/COVID Rapid Antigen (30 min. TAT) - Preferred screening test in ED [[id number]] Collected: 06/04/25 0428    Lab Status: In process Specimen: Nares from Nose Updated: 06/04/25 0431            XR chest 1 view portable   ED Interpretation by Timur Lloyd DO (06/04 0439)   No focal infiltrate          Procedures    ED Medication and Procedure Management   Prior to Admission Medications   Prescriptions Last Dose Informant Patient Reported? Taking?   erythromycin (ILOTYCIN) ophthalmic ointment   No No   Sig: Place a 1/2 inch ribbon of ointment into the lower eyelid every 6 hours and small amount as needed for comfort   Patient not taking: Reported on 6/25/2024   oxyCODONE-acetaminophen (PERCOCET) 5-325 mg per tablet   No No   Sig: Take 1 tablet by mouth every 4 (four) hours as needed for moderate pain or severe pain for up to 10 doses Max Daily Amount: 6 tablets      Facility-Administered Medications: None     Patient's Medications   Discharge Prescriptions    DOXYCYCLINE HYCLATE (VIBRAMYCIN) 100 MG CAPSULE    Take 1 capsule (100 mg total) by mouth 2 (two) times a day for 7 days       Start Date: 6/4/2025  End Date: 6/11/2025       Order Dose: 100 mg       Quantity: 14 capsule    Refills: 0    PREDNISONE 20 MG TABLET    Take 3 tablets (60 mg total) by mouth daily for 5 days       Start Date: 6/4/2025  End Date: 6/9/2025       Order Dose: 60 mg       Quantity: 15 tablet    Refills: 0     No discharge procedures on file.  ED SEPSIS DOCUMENTATION   Time reflects when diagnosis was documented in both MDM as applicable and the Disposition within this note       Time User Action Codes Description Comment    6/4/2025  4:39 AM Timur Lloyd Add [J40] Bronchitis     6/4/2025  4:39 AM Timur Lloyd Add [J06.9] URI (upper respiratory infection)                      [1] No past medical history on file.  [2] No past  surgical history on file.  [3] No family history on file.  [4]   Social History  Tobacco Use    Smoking status: Every Day     Current packs/day: 1.00     Types: Cigarettes    Smokeless tobacco: Never   Vaping Use    Vaping status: Every Day   Substance Use Topics    Alcohol use: Not Currently    Drug use: Not Currently        Timur JODI Lloyd DO  06/04/25 0444

## 2025-07-19 ENCOUNTER — APPOINTMENT (EMERGENCY)
Dept: RADIOLOGY | Facility: HOSPITAL | Age: 35
End: 2025-07-19

## 2025-07-19 ENCOUNTER — HOSPITAL ENCOUNTER (EMERGENCY)
Facility: HOSPITAL | Age: 35
Discharge: HOME | End: 2025-07-19
Attending: EMERGENCY MEDICINE | Admitting: EMERGENCY MEDICINE

## 2025-07-19 VITALS
RESPIRATION RATE: 16 BRPM | BODY MASS INDEX: 24.13 KG/M2 | WEIGHT: 145 LBS | SYSTOLIC BLOOD PRESSURE: 135 MMHG | OXYGEN SATURATION: 98 % | DIASTOLIC BLOOD PRESSURE: 91 MMHG | TEMPERATURE: 98 F | HEART RATE: 81 BPM

## 2025-07-19 DIAGNOSIS — S99.929A FOOT INJURY: Primary | ICD-10-CM

## 2025-07-19 PROCEDURE — 90715 TDAP VACCINE 7 YRS/> IM: CPT | Performed by: EMERGENCY MEDICINE

## 2025-07-19 PROCEDURE — 99283 EMERGENCY DEPT VISIT LOW MDM: CPT

## 2025-07-19 PROCEDURE — 73630 X-RAY EXAM OF FOOT: CPT

## 2025-07-19 PROCEDURE — 90471 IMMUNIZATION ADMIN: CPT

## 2025-07-19 PROCEDURE — 99284 EMERGENCY DEPT VISIT MOD MDM: CPT | Performed by: EMERGENCY MEDICINE

## 2025-07-19 RX ORDER — BACITRACIN, NEOMYCIN, POLYMYXIN B 400; 3.5; 5 [USP'U]/G; MG/G; [USP'U]/G
1 OINTMENT TOPICAL ONCE
Status: COMPLETED | OUTPATIENT
Start: 2025-07-19 | End: 2025-07-19

## 2025-07-19 RX ORDER — ACETAMINOPHEN 325 MG/1
975 TABLET ORAL ONCE
Status: DISCONTINUED | OUTPATIENT
Start: 2025-07-19 | End: 2025-07-19 | Stop reason: HOSPADM

## 2025-07-19 RX ORDER — CLINDAMYCIN HYDROCHLORIDE 150 MG/1
450 CAPSULE ORAL ONCE
Status: COMPLETED | OUTPATIENT
Start: 2025-07-19 | End: 2025-07-19

## 2025-07-19 RX ORDER — IBUPROFEN 400 MG/1
800 TABLET, FILM COATED ORAL ONCE
Status: DISCONTINUED | OUTPATIENT
Start: 2025-07-19 | End: 2025-07-19 | Stop reason: HOSPADM

## 2025-07-19 RX ORDER — ACETAMINOPHEN 325 MG/1
975 TABLET ORAL ONCE
Status: COMPLETED | OUTPATIENT
Start: 2025-07-19 | End: 2025-07-19

## 2025-07-19 RX ORDER — GINSENG 100 MG
1 CAPSULE ORAL 2 TIMES DAILY
Qty: 28 G | Refills: 0 | Status: SHIPPED | OUTPATIENT
Start: 2025-07-19

## 2025-07-19 RX ORDER — IBUPROFEN 400 MG/1
800 TABLET, FILM COATED ORAL ONCE
Status: COMPLETED | OUTPATIENT
Start: 2025-07-19 | End: 2025-07-19

## 2025-07-19 RX ORDER — CLINDAMYCIN HYDROCHLORIDE 150 MG/1
450 CAPSULE ORAL EVERY 6 HOURS
Qty: 60 CAPSULE | Refills: 0 | Status: SHIPPED | OUTPATIENT
Start: 2025-07-19 | End: 2025-07-24

## 2025-07-19 RX ADMIN — BACITRACIN ZINC NEOMYCIN SULFATE POLYMYXIN B SULFATE 1 LARGE APPLICATION: 400; 3.5; 5 OINTMENT TOPICAL at 04:32

## 2025-07-19 RX ADMIN — TETANUS TOXOID, REDUCED DIPHTHERIA TOXOID AND ACELLULAR PERTUSSIS VACCINE, ADSORBED 0.5 ML: 5; 2.5; 8; 8; 2.5 SUSPENSION INTRAMUSCULAR at 04:17

## 2025-07-19 RX ADMIN — IBUPROFEN 800 MG: 400 TABLET, FILM COATED ORAL at 04:14

## 2025-07-19 RX ADMIN — ACETAMINOPHEN 975 MG: 325 TABLET ORAL at 04:14

## 2025-07-19 RX ADMIN — CLINDAMYCIN HYDROCHLORIDE 450 MG: 150 CAPSULE ORAL at 04:15

## 2025-07-19 NOTE — DISCHARGE INSTRUCTIONS
You were seen in the emergency department for a foot wound.  We have irrigated/washed the wound.  We have updated your tetanus, started you on antibiotics, and redressed the wound with Neosporin.  Please  the antibiotics at the pharmacy and take them as directed.  Also please take Tylenol, and Motrin as needed for the pain, we have provided you with a special boot/sandal.  Please use it as needed for comfort.  Please follow-up with podiatry outpatient for further evaluation.  We have provided you with an ambulatory referral to their office.  They are expecting your phone call.  If your symptoms worsen or persist, please return to the emergency department immediate.

## 2025-07-19 NOTE — ED PROVIDER NOTES
Time reflects when diagnosis was documented in both MDM as applicable and the Disposition within this note       Time User Action Codes Description Comment    7/19/2025  4:07 AM CroninMichael Add [S99.929A] Foot injury           ED Disposition       ED Disposition   Discharge    Condition   Stable    Date/Time   Sat Jul 19, 2025  4:07 AM    Comment   Victor M Allan discharge to home/self care.                   Assessment & Plan       Medical Decision Making  35-year-old male presents to the emergency department with wound on left foot.  Differential diagnosis include contusion, soft tissue injury, first metatarsal fracture, cellulitis, superficial skin infection, will perform x-ray to rule out fracture.  Patient will be provided boot, antibiotics, and follow-up to podiatry outpatient.  Discussed results with the patient.  Strict return precautions given.  Patient understands and agrees with treatment plan.    Amount and/or Complexity of Data Reviewed  Radiology: ordered.    Risk  OTC drugs.  Prescription drug management.             Medications   acetaminophen (TYLENOL) tablet 975 mg (has no administration in time range)   ibuprofen (MOTRIN) tablet 800 mg (has no administration in time range)   neomycin-bacitracin-polymyxin b (NEOSPORIN) ointment 1 large application (has no administration in time range)   tetanus-diphtheria-acellular pertussis (BOOSTRIX) IM injection 0.5 mL (has no administration in time range)   clindamycin (CLEOCIN) capsule 450 mg (has no administration in time range)   acetaminophen (TYLENOL) tablet 975 mg (has no administration in time range)   ibuprofen (MOTRIN) tablet 800 mg (has no administration in time range)       ED Risk Strat Scores                    No data recorded        SBIRT 20yo+      Flowsheet Row Most Recent Value   Initial Alcohol Screen: US AUDIT-C     1. How often do you have a drink containing alcohol? 0 Filed at: 07/19/2025 0353   2. How many drinks containing alcohol do  you have on a typical day you are drinking?  0 Filed at: 07/19/2025 0353   3a. Male UNDER 65: How often do you have five or more drinks on one occasion? 0 Filed at: 07/19/2025 0353   Audit-C Score 0 Filed at: 07/19/2025 0353   DAVIAN: How many times in the past year have you...    Used an illegal drug or used a prescription medication for non-medical reasons? Never Filed at: 07/19/2025 0353                            History of Present Illness       Chief Complaint   Patient presents with    Foot Pain     Patient presents to the ED with complaints of pain to the left foot. The patient states he dropped a gallon of paint on his foot last evening.        Past Medical History[1]   Past Surgical History[2]   Family History[3]   Social History[4]   E-Cigarette/Vaping    E-Cigarette Use Current Every Day User       E-Cigarette/Vaping Substances      I have reviewed and agree with the history as documented.     35-year-old male presents to the emergency department with complaint of left foot pain.  Patient states that yesterday evening, he dropped a can of paint on his left foot.  Patient states that he sustained a abrasion to the medial dorsal aspect of foot just proximal to the great toe.  Patient states that the area was bleeding.  Patient had a first-aid kit at home that stops of bleeding, he used a product called bleed stop which was a white liquid.  Patient noticed some bleeding today, and came in for further evaluation.  On initial evaluation, foot was washed, small abrasion appreciated, no bleeding, patient is neurovascularly intact throughout, there is some swelling, and tenderness to palpation on first metatarsal.  Bacitracin placed on abrasion, tetanus updated, antibiotics ordered.  X-ray ordered.            Review of Systems   Constitutional:  Negative for chills and fever.   HENT:  Negative for ear pain and sore throat.    Eyes:  Negative for pain and visual disturbance.   Respiratory:  Negative for cough and  shortness of breath.    Cardiovascular:  Negative for chest pain and palpitations.   Gastrointestinal:  Negative for abdominal pain and vomiting.   Genitourinary:  Negative for dysuria and hematuria.   Musculoskeletal:  Negative for arthralgias and back pain.   Skin:  Positive for rash and wound. Negative for color change.   Neurological:  Negative for seizures and syncope.   All other systems reviewed and are negative.          Objective       ED Triage Vitals [07/19/25 0353]   Temperature Pulse Blood Pressure Respirations SpO2 Patient Position - Orthostatic VS   98 °F (36.7 °C) 81 135/91 16 98 % Sitting      Temp Source Heart Rate Source BP Location FiO2 (%) Pain Score    Temporal Monitor Right arm -- 7      Vitals      Date and Time Temp Pulse SpO2 Resp BP Pain Score FACES Pain Rating User   07/19/25 0353 98 °F (36.7 °C) 81 98 % 16 135/91 7 -- RR            Physical Exam  Vitals and nursing note reviewed.   Constitutional:       General: He is not in acute distress.     Appearance: He is well-developed.   HENT:      Head: Normocephalic and atraumatic.     Eyes:      Conjunctiva/sclera: Conjunctivae normal.       Cardiovascular:      Rate and Rhythm: Normal rate and regular rhythm.   Pulmonary:      Effort: Pulmonary effort is normal. No respiratory distress.      Breath sounds: Normal breath sounds.   Abdominal:      Palpations: Abdomen is soft.      Tenderness: There is no abdominal tenderness.     Musculoskeletal:         General: No swelling.      Cervical back: Neck supple.     Skin:     General: Skin is warm and dry.      Capillary Refill: Capillary refill takes less than 2 seconds.      Findings: Lesion and rash present.     Neurological:      Mental Status: He is alert.     Psychiatric:         Mood and Affect: Mood normal.         Results Reviewed       None            XR foot 2 views LEFT    (Results Pending)       Procedures    ED Medication and Procedure Management   Prior to Admission Medications    Prescriptions Last Dose Informant Patient Reported? Taking?   erythromycin (ILOTYCIN) ophthalmic ointment   No No   Sig: Place a 1/2 inch ribbon of ointment into the lower eyelid every 6 hours and small amount as needed for comfort   Patient not taking: Reported on 6/25/2024   oxyCODONE-acetaminophen (PERCOCET) 5-325 mg per tablet   No No   Sig: Take 1 tablet by mouth every 4 (four) hours as needed for moderate pain or severe pain for up to 10 doses Max Daily Amount: 6 tablets      Facility-Administered Medications: None     Patient's Medications   Discharge Prescriptions    BACITRACIN TOPICAL OINTMENT 500 UNITS/G TOPICAL OINTMENT    Apply 1 large application topically 2 (two) times a day       Start Date: 7/19/2025 End Date: --       Order Dose: 1 large application       Quantity: 28 g    Refills: 0    CLINDAMYCIN (CLEOCIN) 150 MG CAPSULE    Take 3 capsules (450 mg total) by mouth every 6 (six) hours for 5 days       Start Date: 7/19/2025 End Date: 7/24/2025       Order Dose: 450 mg       Quantity: 60 capsule    Refills: 0       ED SEPSIS DOCUMENTATION   Time reflects when diagnosis was documented in both MDM as applicable and the Disposition within this note       Time User Action Codes Description Comment    7/19/2025  4:07 AM Michael Cronin Add [S99.929A] Foot injury                    [1] No past medical history on file.  [2] No past surgical history on file.  [3] No family history on file.  [4]   Social History  Tobacco Use    Smoking status: Every Day     Current packs/day: 1.00     Types: Cigarettes    Smokeless tobacco: Never   Vaping Use    Vaping status: Every Day   Substance Use Topics    Alcohol use: Not Currently    Drug use: Not Currently        Michael Cronin,   07/19/25 0413